# Patient Record
Sex: MALE | Race: WHITE | NOT HISPANIC OR LATINO | URBAN - METROPOLITAN AREA
[De-identification: names, ages, dates, MRNs, and addresses within clinical notes are randomized per-mention and may not be internally consistent; named-entity substitution may affect disease eponyms.]

---

## 2018-01-17 ENCOUNTER — EMERGENCY (EMERGENCY)
Facility: HOSPITAL | Age: 38
LOS: 1 days | Discharge: HOME | End: 2018-01-17
Attending: EMERGENCY MEDICINE | Admitting: EMERGENCY MEDICINE
Payer: COMMERCIAL

## 2018-01-17 VITALS
OXYGEN SATURATION: 98 % | HEART RATE: 70 BPM | RESPIRATION RATE: 16 BRPM | TEMPERATURE: 98 F | SYSTOLIC BLOOD PRESSURE: 111 MMHG | DIASTOLIC BLOOD PRESSURE: 70 MMHG | WEIGHT: 190.04 LBS

## 2018-01-17 VITALS
SYSTOLIC BLOOD PRESSURE: 122 MMHG | TEMPERATURE: 98 F | HEART RATE: 70 BPM | RESPIRATION RATE: 18 BRPM | DIASTOLIC BLOOD PRESSURE: 82 MMHG | OXYGEN SATURATION: 98 %

## 2018-01-17 DIAGNOSIS — E78.5 HYPERLIPIDEMIA, UNSPECIFIED: ICD-10-CM

## 2018-01-17 DIAGNOSIS — I10 ESSENTIAL (PRIMARY) HYPERTENSION: ICD-10-CM

## 2018-01-17 DIAGNOSIS — R07.89 OTHER CHEST PAIN: ICD-10-CM

## 2018-01-17 DIAGNOSIS — I25.10 ATHEROSCLEROTIC HEART DISEASE OF NATIVE CORONARY ARTERY WITHOUT ANGINA PECTORIS: ICD-10-CM

## 2018-01-17 LAB
ANION GAP SERPL CALC-SCNC: 13 MMOL/L — SIGNIFICANT CHANGE UP (ref 5–17)
BUN SERPL-MCNC: 16 MG/DL — SIGNIFICANT CHANGE UP (ref 7–23)
CALCIUM SERPL-MCNC: 9.6 MG/DL — SIGNIFICANT CHANGE UP (ref 8.4–10.5)
CHLORIDE SERPL-SCNC: 100 MMOL/L — SIGNIFICANT CHANGE UP (ref 96–108)
CK MB CFR SERPL CALC: 1.1 NG/ML — SIGNIFICANT CHANGE UP (ref 0–6.7)
CK SERPL-CCNC: 79 U/L — SIGNIFICANT CHANGE UP (ref 30–200)
CO2 SERPL-SCNC: 25 MMOL/L — SIGNIFICANT CHANGE UP (ref 22–31)
CREAT SERPL-MCNC: 0.75 MG/DL — SIGNIFICANT CHANGE UP (ref 0.5–1.3)
GLUCOSE SERPL-MCNC: 90 MG/DL — SIGNIFICANT CHANGE UP (ref 70–99)
HCT VFR BLD CALC: 46.2 % — SIGNIFICANT CHANGE UP (ref 39–50)
HGB BLD-MCNC: 15.4 G/DL — SIGNIFICANT CHANGE UP (ref 13–17)
MCHC RBC-ENTMCNC: 30.6 PG — SIGNIFICANT CHANGE UP (ref 27–34)
MCHC RBC-ENTMCNC: 33.3 G/DL — SIGNIFICANT CHANGE UP (ref 32–36)
MCV RBC AUTO: 91.8 FL — SIGNIFICANT CHANGE UP (ref 80–100)
PLATELET # BLD AUTO: 234 K/UL — SIGNIFICANT CHANGE UP (ref 150–400)
POTASSIUM SERPL-MCNC: 4.7 MMOL/L — SIGNIFICANT CHANGE UP (ref 3.5–5.3)
POTASSIUM SERPL-SCNC: 4.7 MMOL/L — SIGNIFICANT CHANGE UP (ref 3.5–5.3)
RBC # BLD: 5.03 M/UL — SIGNIFICANT CHANGE UP (ref 4.2–5.8)
RBC # FLD: 12.7 % — SIGNIFICANT CHANGE UP (ref 10.3–16.9)
SODIUM SERPL-SCNC: 138 MMOL/L — SIGNIFICANT CHANGE UP (ref 135–145)
TROPONIN T SERPL-MCNC: <0.01 NG/ML — SIGNIFICANT CHANGE UP (ref 0–0.01)
WBC # BLD: 5.9 K/UL — SIGNIFICANT CHANGE UP (ref 3.8–10.5)
WBC # FLD AUTO: 5.9 K/UL — SIGNIFICANT CHANGE UP (ref 3.8–10.5)

## 2018-01-17 PROCEDURE — 36415 COLL VENOUS BLD VENIPUNCTURE: CPT

## 2018-01-17 PROCEDURE — 93005 ELECTROCARDIOGRAM TRACING: CPT | Mod: XU

## 2018-01-17 PROCEDURE — 99284 EMERGENCY DEPT VISIT MOD MDM: CPT | Mod: 25

## 2018-01-17 PROCEDURE — 75574 CT ANGIO HRT W/3D IMAGE: CPT | Mod: 26

## 2018-01-17 PROCEDURE — 71046 X-RAY EXAM CHEST 2 VIEWS: CPT | Mod: 26

## 2018-01-17 PROCEDURE — 93010 ELECTROCARDIOGRAM REPORT: CPT | Mod: 59

## 2018-01-17 PROCEDURE — 82550 ASSAY OF CK (CPK): CPT

## 2018-01-17 PROCEDURE — 80048 BASIC METABOLIC PNL TOTAL CA: CPT

## 2018-01-17 PROCEDURE — 99285 EMERGENCY DEPT VISIT HI MDM: CPT | Mod: 25

## 2018-01-17 PROCEDURE — 84484 ASSAY OF TROPONIN QUANT: CPT

## 2018-01-17 PROCEDURE — 71046 X-RAY EXAM CHEST 2 VIEWS: CPT

## 2018-01-17 PROCEDURE — 85027 COMPLETE CBC AUTOMATED: CPT

## 2018-01-17 PROCEDURE — 82553 CREATINE MB FRACTION: CPT

## 2018-01-17 PROCEDURE — 75574 CT ANGIO HRT W/3D IMAGE: CPT

## 2018-01-17 RX ORDER — METOPROLOL TARTRATE 50 MG
50 TABLET ORAL ONCE
Qty: 0 | Refills: 0 | Status: COMPLETED | OUTPATIENT
Start: 2018-01-17 | End: 2018-01-17

## 2018-01-17 RX ORDER — ASPIRIN/CALCIUM CARB/MAGNESIUM 324 MG
325 TABLET ORAL ONCE
Qty: 0 | Refills: 0 | Status: COMPLETED | OUTPATIENT
Start: 2018-01-17 | End: 2018-01-17

## 2018-01-17 RX ORDER — ASPIRIN/CALCIUM CARB/MAGNESIUM 325 MG
325 TABLET ORAL ONCE
Qty: 0 | Refills: 0 | Status: DISCONTINUED | OUTPATIENT
Start: 2018-01-17 | End: 2018-01-17

## 2018-01-17 RX ORDER — NITROGLYCERIN 6.5 MG
0.4 CAPSULE, EXTENDED RELEASE ORAL
Qty: 0 | Refills: 0 | Status: COMPLETED | OUTPATIENT
Start: 2018-01-17 | End: 2018-01-17

## 2018-01-17 RX ADMIN — Medication 0.4 MILLIGRAM(S): at 13:40

## 2018-01-17 RX ADMIN — Medication 50 MILLIGRAM(S): at 12:54

## 2018-01-17 RX ADMIN — Medication 0.4 MILLIGRAM(S): at 12:54

## 2018-01-17 RX ADMIN — Medication 325 MILLIGRAM(S): at 12:54

## 2018-01-17 RX ADMIN — Medication 0.4 MILLIGRAM(S): at 13:29

## 2018-01-17 NOTE — ED PROVIDER NOTE - PROGRESS NOTE DETAILS
Pt discussed w Dr Gonzalez who requests cta if enzymes neg. Enzymes neg, cp improved after ntg.  Pt sent for cta - cta abnl w borderline severe stenosis and elevated ca score.  Dr Gonzalez paged but, per his office, he is in a meeting; they are trying to connect w him or have his pa come to ed.  However, pt states he does not want to wait and wants to leave ed.  Discussed risk for cardiac event such as mi, death due to diagnosed but untreated atherosclerotic disease; pt understands risks and wants to sign out ama. Enzymes neg, cp improved after ntg.  Pt sent for cta - cta abnl w borderline severe stenosis and elevated ca score.  Dr Gonzalez paged but, per his office, he is in a meeting; they are trying to connect w him or have his pa come to ed.  However, pt states he does not want to wait and wants to leave ed.  Discussed risk for cardiac event such as mi, death due to diagnosed but untreated atherosclerotic disease; pt understands risks and wants to sign out ama. Pt reports he has a different cardiologist and wants a second opinion; reports he will see his cardiologist tomorrow am.

## 2018-01-17 NOTE — ED ADULT NURSE NOTE - OBJECTIVE STATEMENT
Pt presents with PMH significant for hypercholesterolemia, HTN, and depression with complaints of non radiating cp [id number] and sob that has worsened today with new onset of intermittent nausea. Pt states that PCP recommended that he come to the ED and have possible CTA. Pt denies any fevers, no lightheadedness, no dizziness, no changes to bowels, no urinary complaints.

## 2018-01-17 NOTE — ED PROVIDER NOTE - OBJECTIVE STATEMENT
36 yo male h/o htn, hld, fh cad (father mi 40) w/o h/o cad, dm, tob c/o L sided cp - 9/10, intermittent since this am; no radiation, no associated dizziness, diaphoresis, sob, palpitations, change w exertion or recent exertional sx.  Sx started on waking this am.  No recent travel, le pain/swelling, h/o or fh pe/dvt, cough/uri sx, fever, abd pain, gerd.  No h/o similar.

## 2018-01-17 NOTE — ED ADULT TRIAGE NOTE - CHIEF COMPLAINT QUOTE
intermittent Cp for weeks; denies other symptoms or radiation; meeting "Nurse Adan" ; HX includes htn, HI cholesterol, acid reflux and anxierty/stress

## 2018-01-17 NOTE — ED PROVIDER NOTE - MEDICAL DECISION MAKING DETAILS
Pt c/o intermittent L sided cp since 8am today w/o h/o similar but + fh and + h/o htn, hld.  ? acs.  EKG w/o stemi.  Plan labs, cxr, cta, reassess.

## 2018-01-17 NOTE — ED ADULT NURSE REASSESSMENT NOTE - NS ED NURSE REASSESS COMMENT FT1
Patient signed out AMA. Refusing to stay in hospital for further evaluation. Patient educated on risks of signing AMA and verbalized understanding. Educated to take one baby aspirin daily as per  Director and followup with cardiologist. Patient educated to return to ED if symptoms continue or worsen or develop any chest pain or shortness of breath, verbalized understanding. Exited ED safely ambulating with steady gait.

## 2020-01-31 NOTE — ED ADULT NURSE NOTE - NS ED NOTE  TALK SOMEONE YN
decreased weight-shifting ability/decreased danette/decreased stride length/decreased step length
No

## 2023-08-02 NOTE — ED ADULT NURSE NOTE - CAS EDN INTEG ASSESS
Unread My Chart message, please let him know =Your walk test does not show need for oxygen at this time. Your breathing test does show a slight decline in lung function. More can be discussed at your December appointment with Dr. Tristan Amin. Call with worsening symptoms such as increased shortness of breath, productive cough, wheezing or symptoms not responding to treatment plan. WDL
